# Patient Record
Sex: MALE | Race: ASIAN | NOT HISPANIC OR LATINO | URBAN - METROPOLITAN AREA
[De-identification: names, ages, dates, MRNs, and addresses within clinical notes are randomized per-mention and may not be internally consistent; named-entity substitution may affect disease eponyms.]

---

## 2022-05-24 ENCOUNTER — EMERGENCY (EMERGENCY)
Facility: HOSPITAL | Age: 42
LOS: 1 days | Discharge: HOME | End: 2022-05-24
Attending: EMERGENCY MEDICINE | Admitting: EMERGENCY MEDICINE
Payer: OTHER MISCELLANEOUS

## 2022-05-24 VITALS
HEART RATE: 59 BPM | DIASTOLIC BLOOD PRESSURE: 89 MMHG | TEMPERATURE: 98 F | OXYGEN SATURATION: 100 % | SYSTOLIC BLOOD PRESSURE: 132 MMHG | RESPIRATION RATE: 16 BRPM

## 2022-05-24 PROCEDURE — 12001 RPR S/N/AX/GEN/TRNK 2.5CM/<: CPT

## 2022-05-24 PROCEDURE — 99053 MED SERV 10PM-8AM 24 HR FAC: CPT

## 2022-05-24 PROCEDURE — 73130 X-RAY EXAM OF HAND: CPT | Mod: 26,LT

## 2022-05-24 PROCEDURE — 99283 EMERGENCY DEPT VISIT LOW MDM: CPT | Mod: 25

## 2022-05-24 RX ORDER — LIDOCAINE HCL 20 MG/ML
10 VIAL (ML) INJECTION ONCE
Refills: 0 | Status: COMPLETED | OUTPATIENT
Start: 2022-05-24 | End: 2022-05-24

## 2022-05-24 RX ADMIN — Medication 10 MILLILITER(S): at 06:59

## 2022-05-24 NOTE — ED PROVIDER NOTE - NS ED ATTENDING STATEMENT MOD
This was a shared visit with the VICENTE. I reviewed and verified the documentation and independently performed the documented:

## 2022-05-24 NOTE — ED ADULT NURSE NOTE - OBJECTIVE STATEMENT
42 yom presents A&Ox4 c/o laceration on L third digit, states finger got caught between material at work. Pt arrived w/ injury wrapped in gauze. Respirations even and unlabored, sating 100% on RA, pulse motor sensation and strength equal and present in all 4 extremities. Pt denies any chest pain, dyspnea, dizziness, numbness or tingling on extremity. Pt well appearing, NAD noted. Pending sutures by MD.

## 2022-05-24 NOTE — ED PROVIDER NOTE - PATIENT PORTAL LINK FT
You can access the FollowMyHealth Patient Portal offered by [facility] by registering at the following website: [email or web address]. By joining [facility]’s FollowMyHealth portal, you will also be able to view your health information using other applications (apps) compatible with our system.

## 2022-05-24 NOTE — ED ADULT TRIAGE NOTE - CHIEF COMPLAINT QUOTE
Pt. c/o injury to third digit on left hand. Finger wrapped in gauze, dried blood noted. Denies use of thinners.

## 2022-05-24 NOTE — ED PROVIDER NOTE - CLINICAL SUMMARY MEDICAL DECISION MAKING FREE TEXT BOX
pt with crush injury to finger.  With regards to the subungual hematomas will trephinate for symptomatic relief.  Lacs will need to be repaired.  Will attempt primary repair but low threshold to call for plastic hand assistance for complete closure.  Tetanus is UTD.  Films to look for fracture

## 2022-05-24 NOTE — ED PROCEDURE NOTE - ATTENDING CONTRIBUTION TO CARE
I was present for the critical portions of the procedure.  I was also immediately available for any and all other parts of the procedure
I was present for the critical portions of the procedure.  I was also immediately available for any and all other parts of the procedure

## 2022-05-24 NOTE — ED PROVIDER NOTE - PROGRESS NOTE DETAILS
CHANELL Bernal- Pt seen and eval by Hand surgery, Dr. Vega, sutures intact, dressed wound, can f/u in the office in 1 week. will dc on augmentin.

## 2022-05-24 NOTE — ED PROVIDER NOTE - OBJECTIVE STATEMENT
43 yo with no sig PMH presenting after a crush injury where he had the 2nd and 3rd digits of his L hand crushed between two heavy rolls of labels.  Currently with constant throbbing severe pain in the two fingers.  Reports laceration as well.  Last tetanus was 7 years ago.  R hand dominant.

## 2022-05-24 NOTE — ED PROVIDER NOTE - CARE PROVIDER_API CALL
Jad Vega (MD)  Plastic Surgery; Surgery of the Hand  [address], [address]  [place] [address]  Phone: [phone]  Fax: [phone]  Follow Up Time:

## 2022-05-24 NOTE — ED PROVIDER NOTE - PHYSICAL EXAMINATION
Vitals: I have reviewed the patients vital signs  General: nontoxic appearing  HEENT: Atraumatic, normocephalic, airway patent  Eyes: EOMI, tracking appropriately  Neck: no tracheal deviation  Chest/Lungs: no trauma, symmetric chest rise, speaking in complete sentences,  no resp distress  Heart: skin and extremities well perfused, regular rate and rhythm  Neuro: A+Ox3, ambulating without difficulty, appears non focal  MSK: strength at baseline in all extremities, L hand there are 50% subungual hematomas of the 2nd and 3rd digits.  On the palmar surface of the 3rd digit there are 3-4 lacerations in the pad that are protruding some adipose.    Skin: no cyanosis, no jaundice
